# Patient Record
Sex: FEMALE | Race: AMERICAN INDIAN OR ALASKA NATIVE
[De-identification: names, ages, dates, MRNs, and addresses within clinical notes are randomized per-mention and may not be internally consistent; named-entity substitution may affect disease eponyms.]

---

## 2020-07-22 ENCOUNTER — HOSPITAL ENCOUNTER (EMERGENCY)
Dept: HOSPITAL 43 - DL.ED | Age: 35
LOS: 1 days | Discharge: NURSING, REHAB OR LONG-TERM CARE | End: 2020-07-23
Payer: SELF-PAY

## 2020-07-22 VITALS — SYSTOLIC BLOOD PRESSURE: 149 MMHG | HEART RATE: 119 BPM | DIASTOLIC BLOOD PRESSURE: 115 MMHG

## 2020-07-22 DIAGNOSIS — F17.210: ICD-10-CM

## 2020-07-22 DIAGNOSIS — N61.1: ICD-10-CM

## 2020-07-22 DIAGNOSIS — A41.9: Primary | ICD-10-CM

## 2020-07-22 DIAGNOSIS — Z20.828: ICD-10-CM

## 2020-07-22 PROCEDURE — U0002 COVID-19 LAB TEST NON-CDC: HCPCS

## 2020-07-22 NOTE — EDM.PDOC
ED HPI GENERAL MEDICAL PROBLEM





- General


Chief Complaint: Skin Complaint


Stated Complaint: SEIZURE


Time Seen by Provider: 20 23:15


Source of Information: Reports: Patient, RN


History Limitations: Reports: No Limitations





- History of Present Illness


INITIAL COMMENTS - FREE TEXT/NARRATIVE: 





ED with report of hit in right breast 2 weeks ago with softball, Increased pain 

tonight and "kids made her come". Has had chills unknown if fever. Large amount 

of drainage tonight when removing bra. Admits due to pain has not had bra off 

for 2-3 days. 


  ** Right Breast


Pain Score (Numeric/FACES): 10





- Related Data


                                    Allergies











Allergy/AdvReac Type Severity Reaction Status Date / Time


 


No Known Allergies Allergy   Verified 20 23:20











Home Meds: 


                                    Home Meds





Pnv with Ca,No.72/Iron/Fa [Prenatal Plus Tablet] 1 each PO DAILY 09/10/14 

[History]


Acetaminophen/oxyCODONE [Percocet 325-5 MG] 2 tab PO Q4H PRN #30 tablet 09/15/14

[Rx]


Docusate Sodium [Colace] 100 mg PO Q8H PRN #90 cap 09/15/14 [Rx]


Ferrous Sulfate 325 mg PO BID #60 tablet 09/15/14 [Rx]


Ibuprofen 800 mg PO Q8H PRN #30 tablet 09/15/14 [Rx]











Past Medical History


OB/GYN History: Reports: Pregnancy


Oncologic (Cancer) History: Reports: Other (See Below)


Other Oncologic History: Neuroblastoma, in remission





- Past Surgical History


Female  Surgical History: Reports:  Section





Social & Family History





- Tobacco Use


Smoking Status *Q: Current Every Day Smoker


Years of Tobacco use: 6


Packs/Tins Daily: 0.5





- Caffeine Use


Caffeine Use: Reports: None





- Recreational Drug Use


Recreational Drug Use: No





ED ROS GENERAL





- Review of Systems


Review Of Systems: Comprehensive ROS is negative, except as noted in HPI.





ED EXAM, SKIN/RASH


Exam: See Below


Exam Limited By: No Limitations


General Appearance: Alert, Obese


Ears: Normal External Exam, Hearing Grossly Normal, Normal TMs


Nose: Normal Inspection


Throat/Mouth: Normal Inspection


Head: Atraumatic, Normocephalic


Neck: Full Range of Motion


Respiratory/Chest: No Respiratory Distress, Crackles (right base greater than 

left, clear with cough), Other (right breast red warmth  large 3x2 blistered 

area, sloughing, brown thick purulent drainage, 3x 5 dark red / blackish area 

above nippele)


Cardiovascular: Normal Peripheral Pulses, Regular Rate, Rhythm, Tachycardia


GI/Abdominal: Normal Bowel Sounds


 (Female) Exam: Normal External Exam


Back Exam: Normal Inspection


Neurological: Alert, Oriented


Psychiatric: Anxious


Skin: Warm, Dry, Intact, Normal Color


Location, Skin: Other (right breast)


Characteristics: Bullous, Necrotic


Associated features: Warmth, Tenderness, Swelling, Induration, Weeping (brown 

thick drainage.)





Course





- Vital Signs


Last Recorded V/S: 


                                Last Vital Signs











Temp  98.8 F   20 23:13


 


Pulse  119 H  20 23:13


 


Resp  22 H  20 23:13


 


BP  149/115 H  20 23:13


 


Pulse Ox  100   20 23:13














- Orders/Labs/Meds


Orders: 


                               Active Orders 24 hr











 Category Date Time Status


 


 CULTURE WOUND [RM] Stat Lab  20 23:10 Received


 


 Blood Culture x2 Reflex Set [OM.PC] Stat Oth  20 23:14 Ordered











Labs: 


                                Laboratory Tests











  20 Range/Units





  00:15 00:20 00:23 


 


WBC   11.8 H   (5.0-10.0)  10^3/uL


 


RBC   4.49   (4.2-5.4)  10^6/uL


 


Hgb   12.9  D   (12.0-16.0)  g/dL


 


Hct   39.5   (37.0-47.0)  %


 


MCV   88.0   ()  fL


 


MCH   28.7   (27.0-34.0)  pg


 


MCHC   32.7 L   (33.0-35.0)  g/dL


 


Plt Count   312   (150-450)  10^3/uL


 


Neut % (Auto)   75.3 H   (42.2-75.2)  %


 


Lymph % (Auto)   14.4 L   (20.5-50.1)  %


 


Mono % (Auto)   8.4 H   (2-8)  %


 


Eos % (Auto)   1.7   (1.0-3.0)  %


 


Baso % (Auto)   0.2   (0.0-1.0)  %


 


Creatinine  0.39 L    (0.55-1.02)  mg/dL


 


Est Cr Clr Drug Dosing  192.06    mL/min


 


Estimated GFR (MDRD)  > 60    


 


Urine Color    Yellow  (YELLOW)  


 


Urine Appearance    Slightly cloudy  (CLEAR)  


 


Urine pH    8.0  (5.0-9.0)  


 


Ur Specific Gravity    1.020  (1.005-1.030)  


 


Urine Protein    Negative  (NEGATIVE)  


 


Urine Glucose (UA)    Negative  (NEGATIVE)  


 


Urine Ketones    Negative  (NEGATIVE)  


 


Urine Occult Blood    Moderate H  (NEGATIVE)  


 


Urine Nitrite    Negative  (NEGATIVE)  


 


Urine Bilirubin    Negative  (NEGATIVE)  


 


Urine Urobilinogen    1.0  (0.2-1.0)  mg/dL


 


Ur Leukocyte Esterase    Negative  (NEGATIVE)  


 


Urine RBC    5-10 H  /HPF


 


Urine WBC    0-5  (0-5/HPF)  /HPF


 


Ur Epithelial Cells    Moderate H  (NOT SEEN)  /HPF


 


Amorphous Sediment    Few  (NOT SEEN)  /HPF


 


Urine Bacteria    Few  (0-FEW/HPF)  /HPF


 


Urine Mucus    Rare  (NOT SEEN)  /LPF


 


Urine HCG, Qual     


 


Urine Opiates Screen     (NEGATIVE)  


 


Ur Oxycodone Screen     (NEGATIVE)  


 


Urine Methadone Screen     (NEGATIVE)  


 


Ur Barbiturates Screen     (NEGATIVE)  


 


U Tricyclic Antidepress     (NEGATIVE)  


 


Ur Phencyclidine Scrn     (NEGATIVE)  


 


Ur Amphetamine Screen     (NEGATIVE)  


 


U Methamphetamines Scrn     (NEGATIVE)  


 


Urine MDMA Screen     (NEGATIVE)  


 


U Benzodiazepines Scrn     (NEGATIVE)  


 


Urine Cocaine Screen     (NEGATIVE)  


 


U Marijuana (THC) Screen     (NEGATIVE)  


 


COVID-19 ([facility])     (NEGATIVE)  














  20 Range/Units





  00:23 00:23 01:18 


 


WBC     (5.0-10.0)  10^3/uL


 


RBC     (4.2-5.4)  10^6/uL


 


Hgb     (12.0-16.0)  g/dL


 


Hct     (37.0-47.0)  %


 


MCV     ()  fL


 


MCH     (27.0-34.0)  pg


 


MCHC     (33.0-35.0)  g/dL


 


Plt Count     (150-450)  10^3/uL


 


Neut % (Auto)     (42.2-75.2)  %


 


Lymph % (Auto)     (20.5-50.1)  %


 


Mono % (Auto)     (2-8)  %


 


Eos % (Auto)     (1.0-3.0)  %


 


Baso % (Auto)     (0.0-1.0)  %


 


Creatinine     (0.55-1.02)  mg/dL


 


Est Cr Clr Drug Dosing     mL/min


 


Estimated GFR (MDRD)     


 


Urine Color     (YELLOW)  


 


Urine Appearance     (CLEAR)  


 


Urine pH     (5.0-9.0)  


 


Ur Specific Gravity     (1.005-1.030)  


 


Urine Protein     (NEGATIVE)  


 


Urine Glucose (UA)     (NEGATIVE)  


 


Urine Ketones     (NEGATIVE)  


 


Urine Occult Blood     (NEGATIVE)  


 


Urine Nitrite     (NEGATIVE)  


 


Urine Bilirubin     (NEGATIVE)  


 


Urine Urobilinogen     (0.2-1.0)  mg/dL


 


Ur Leukocyte Esterase     (NEGATIVE)  


 


Urine RBC     /HPF


 


Urine WBC     (0-5/HPF)  /HPF


 


Ur Epithelial Cells     (NOT SEEN)  /HPF


 


Amorphous Sediment     (NOT SEEN)  /HPF


 


Urine Bacteria     (0-FEW/HPF)  /HPF


 


Urine Mucus     (NOT SEEN)  /LPF


 


Urine HCG, Qual   Negative   


 


Urine Opiates Screen  Negative    (NEGATIVE)  


 


Ur Oxycodone Screen  Negative    (NEGATIVE)  


 


Urine Methadone Screen  Negative    (NEGATIVE)  


 


Ur Barbiturates Screen  Negative    (NEGATIVE)  


 


U Tricyclic Antidepress  Negative    (NEGATIVE)  


 


Ur Phencyclidine Scrn  Negative    (NEGATIVE)  


 


Ur Amphetamine Screen  Negative    (NEGATIVE)  


 


U Methamphetamines Scrn  Positive H    (NEGATIVE)  


 


Urine MDMA Screen  Negative    (NEGATIVE)  


 


U Benzodiazepines Scrn  Negative    (NEGATIVE)  


 


Urine Cocaine Screen  Negative    (NEGATIVE)  


 


U Marijuana (THC) Screen  Negative    (NEGATIVE)  


 


COVID-19 ([facility])    Negative  (NEGATIVE)  











Meds: 


Medications














Discontinued Medications














Generic Name Dose Route Start Last Admin





  Trade Name Freq  PRN Reason Stop Dose Admin


 


Hydromorphone HCl  1 mg  20 23:14  20 23:49





  Dilaudid  IVPUSH  20 23:15  1 mg





  ONETIME ONE   Administration


 


Hydromorphone HCl  1 mg  20 01:53  20 01:59





  Dilaudid  IVPUSH  20 01:54  1 mg





  ONETIME ONE   Administration


 


Piperacillin Sod/Tazobactam  100 mls @ 200 mls/hr  20 23:46  20 

00:35





  Sod 3.375 gm/ Sodium Chloride  IV  20 00:15  200 mls/hr





  ONETIME ONE   Administration


 


Vancomycin HCl 1,500 mg/  500 mls @ 333.333 mls/hr  20 23:46  20 

01:15





  Sodium Chloride  IV  20 01:15  333.333 mls/hr





  ONETIME ONE   Administration


 


Sodium Chloride  1,000 mls @ 999 mls/hr  20 00:37  20 00:47





  Normal Saline  IV  20 01:37  999 mls/hr





  .BOLUS ONE   Administration


 


Iopamidol  100 ml  20 01:54  20 01:56





  Isovue-300 (61%)  IVPUSH  20 01:55  75 ml





  ONETIME ONE   Administration


 


Ondansetron HCl  4 mg  20 23:14  20 23:49





  Zofran  IVPUSH  20 23:15  4 mg





  ONETIME ONE   Administration














- Re-Assessments/Exams


Free Text/Narrative Re-Assessment/Exam: 





20 04:10


Dr Damon accepting patient. Tx via [facility].  Difficult IV and lab draw. Unable to 

obtain chemistry o rserum cultures prior to initiating antibiotic.





Departure





- Departure


Time of Disposition: 03:20


Disposition: DC/Tfer to Acute Hospital 02


Condition: Fair


Clinical Impression: 


 Abscess, Mastitis





Sepsis


Qualifiers:


 Sepsis type: sepsis due to unspecified organism Sepsis acute organ dysfunction 

status: unspecified Qualified Code(s): A41.9 - Sepsis, unspecified organism








- Discharge Information


*PRESCRIPTION DRUG MONITORING PROGRAM REVIEWED*: No


*COPY OF PRESCRIPTION DRUG MONITORING REPORT IN PATIENT SARAH: No


Referrals: 


PCP,Unobtain [Primary Care Provider] - 


Forms:  ED Department Discharge





Sepsis Event Note (ED)





- Evaluation


Sepsis Screening Result: Possible Sepsis Risk





- Focused Exam


Vital Signs: 


                                   Vital Signs











  Temp Pulse Resp BP Pulse Ox


 


 20 23:13  98.8 F  119 H  22 H  149/115 H  100














- My Orders


Last 24 Hours: 


My Active Orders





20 23:10


CULTURE WOUND [RM] Stat 





20 23:14


Blood Culture x2 Reflex Set [OM.PC] Stat 














- Assessment/Plan


Last 24 Hours: 


My Active Orders





20 23:10


CULTURE WOUND [RM] Stat 





20 23:14


Blood Culture x2 Reflex Set [OM.PC] Stat

## 2020-07-23 NOTE — CT
PROCEDURE INFORMATION: 

Exam: CT Chest With Contrast 

Exam date and time: 7/23/2020 1:28 AM 

Age: 35 years old 

Clinical indication: Other: RT breast pain swelling and wound draining; 

Additional info: Right breast wound draining 



TECHNIQUE: 

Imaging protocol: Computed tomography of the chest with intravenous contrast. 

Radiation optimization: All CT scans at this facility use at least one of these 

dose optimization techniques: automated exposure control; mA and/or kV 

adjustment per patient size (includes targeted exams where dose is matched to 

clinical indication); or iterative reconstruction. 

Contrast material: ISOVUE 300; Contrast volume: 75 ml; Contrast route: 

INTRAVENOUS (IV);  



COMPARISON: 

CR CHEST PA/LAT 3/6/2009 3:19 PM 



FINDINGS: 

Thyroid: There is a hypodense lesion within an enlarged right lobe of the 

thyroid gland. The lesion demonstrates a component of wall irregularity, and 

measures approximately 2.7 cm AP by 3.3 cm transverse dimensions. The lesion 

contains mild hyperattenuation foci, that may represent calcifications. There 

is a 1.1 x 1.2 cm hypodense lesion involving the left lobe of the thyroid 

gland. The differential diagnosis for the thyroid gland lesions includes cyst 

or nodule. 

Lungs: There are foci of mild atelectasis involving both lungs. Mild emphysema 

is present in the upper lobes. 

Pleural space: Unremarkable. No pneumothorax. No pleural effusion. 

Heart: Unremarkable. No cardiomegaly. No pericardial effusion. 

Aorta: Unremarkable. No aortic aneurysm. 

Lymph nodes: Enlarged right axillary lymph node, measuring 1.7 x 1.2 cm. This 

is a nonspecific finding. There is an enlarged right axillary lymph node that 

measures 1.8 x 1.2 cm. The hilum is diffusely fatty. This is suggestive of a 

benign process. 



Gallbladder and bile ducts: A calcified gallstone is present. 

Bones/joints: There are degenerative changes involving the spine. Evidence of a 

prior right thoracotomy. 

Soft tissues: There is severe, diffuse skin thickening involving the right 

breast. Moderate to severe diffuse stranding is present in the right breast. A 

right breast abscess is not identified. The right breast stranding extends 

medially, to the midline/left paracentral aspect of the anterior subcutaneous 

fat. There is a focus of hypodensity involving the anterior/lower aspect of the 

right hemithorax, just caudal to the right breast, measuring 1.8 cm AP x 2.1 cm 

transverse dimensions. There is an isodense right breast mass, that measures up 

to 1.2 x 1.0 cm. It is present in the inner aspect of the right breast.

 

IMPRESSION: 

1. Diffuse skin thickening involving the right breast. There is moderate to 

severe diffuse stranding within the right breast, consistent with inflammation 

and/or edema. The findings in the right breast could be due to mastitis. The 

differential diagnosis includes inflammatory carcinoma with diffuse, local 

spread. There is a 1.2 x 1.0 cm right breast mass. This finding may represent 

intramammary lymph node, fibroadenoma, or well-circumscribed carcinoma.

2. 2.1 x 1.8 cm focus of hypodensity involving the subcutaneous fat of the 

anterior/caudal aspect of the right hemithorax. This finding is just below the 

right breast. It may represent a site of inflammation. 

3. Mild emphysema. 



COMMENTS: 

Consistent with the American College of Radiology's Incidental Findings 

Committee white paper (J Am Elissa Radiol 2015): In patients aged 35 years and 

older with an incidental thyroid nodule equal to or greater than 1.5 cm 

detected on CT, MRI or extrathyroidal US, further evaluation with dedicated 

thyroid US is recommended for patients with normal life expectancy and without 

comorbidities. For smaller nodules without suspicious features, no further 

evaluation or follow up is recommended.

## 2021-05-12 ENCOUNTER — HOSPITAL ENCOUNTER (EMERGENCY)
Dept: HOSPITAL 43 - DL.ED | Age: 36
Discharge: HOME | End: 2021-05-12
Payer: MEDICAID

## 2021-05-12 VITALS — HEART RATE: 110 BPM | DIASTOLIC BLOOD PRESSURE: 97 MMHG | SYSTOLIC BLOOD PRESSURE: 169 MMHG

## 2021-05-12 DIAGNOSIS — L03.012: Primary | ICD-10-CM

## 2021-05-12 DIAGNOSIS — Z72.0: ICD-10-CM

## 2021-05-12 LAB
ANION GAP SERPL CALC-SCNC: 11.8 MEQ/L (ref 7–13)
CHLORIDE SERPL-SCNC: 104 MMOL/L (ref 98–107)
SODIUM SERPL-SCNC: 138 MMOL/L (ref 136–145)

## 2021-05-12 NOTE — EDM.PDOC
ED HPI GENERAL MEDICAL PROBLEM





- General


Chief Complaint: Skin Complaint


Stated Complaint: INFECTION IN HAND SPREADING TO ARM


Time Seen by Provider: 21 14:37


Source of Information: Reports: Patient, Old Records, RN, RN Notes Reviewed


History Limitations: Reports: No Limitations





- History of Present Illness


INITIAL COMMENTS - FREE TEXT/NARRATIVE: 


Pt presents to ED via POV with c/o left hand infection. Pt states she had a hang

nail a few days go on the 3rd and 4th fingers. Pt states she has been soaking 

her hand in H202. Denies fever/chills. Admits to some purulent drainage from the

cuticles of both fingers and redness spreading up the dorsum of the left hand.





Onset: Gradual


Duration: Constant


Location: Reports: Upper Extremity, Left


Quality: Reports: Ache


Severity: Moderate


Improves with: Reports: None


Worsens with: Reports: Movement


Associated Symptoms: Reports: No Other Symptoms


Treatments PTA: Reports: NSAIDS





- Related Data


                                    Allergies











Allergy/AdvReac Type Severity Reaction Status Date / Time


 


No Known Allergies Allergy   Verified 21 14:26











Home Meds: 


                                    Home Meds





Ibuprofen 800 mg PO Q8H PRN #30 tablet 09/15/14 [Rx]











Past Medical History


OB/GYN History: Reports: Pregnancy


Oncologic (Cancer) History: Reports: Other (See Below)


Other Oncologic History: Neuroblastoma, in remission





- Past Surgical History


Female  Surgical History: Reports:  Section





Social & Family History





- Tobacco Use


Tobacco Use Status *Q: Current Every Day Tobacco User


Years of Tobacco use: 10


Packs/Tins Daily: 1





- Caffeine Use


Caffeine Use: Reports: Coffee





- Recreational Drug Use


Recreational Drug Use: No





- Living Situation & Occupation


Living situation: Reports: with Family





ED ROS GENERAL





- Review of Systems


Review Of Systems: Comprehensive ROS is negative, except as noted in HPI.





ED EXAM, SKIN/RASH


Exam: See Below


Exam Limited By: No Limitations


General Appearance: Alert, WD/WN, No Apparent Distress, Obese


Nose: Normal Inspection


Throat/Mouth: Normal Inspection


Head: Atraumatic, Normocephalic


Neck: Normal Inspection


Respiratory/Chest: No Respiratory Distress, Lungs Clear, Normal Breath Sounds, 

No Accessory Muscle Use, Chest Non-Tender


Cardiovascular: Regular Rate, Rhythm, Tachycardia


Peripheral Pulses: 3+: Radial (L), Radial (R)


Extremities: Normal Range of Motion, Normal Capillary Refill, Redness (Left  3rd

 and 4th fingers with spread to dorsal left hand).  No: Joint Swelling


Neurological: Alert, Oriented, No Motor/Sensory Deficits


Psychiatric: Normal Mood


Skin: Warm, Dry, Erythema, Increased Warmth


Location, Skin: Upper Extremity, Left





Course





- Vital Signs


Last Recorded V/S: 


                                Last Vital Signs











Temp  98 F   21 14:27


 


Pulse  110 H  21 14:27


 


Resp  16   21 14:27


 


BP  169/97 H  21 14:27


 


Pulse Ox  100   21 14:27














- Orders/Labs/Meds


Orders: 


                               Active Orders 24 hr











 Category Date Time Status


 


 Peripheral IV Care [RC] .AS DIRECTED Care  21 14:38 Active


 


 CULTURE BLOOD [BC] Stat Lab  21 15:05 Results


 


 CULTURE BLOOD [BC] Stat Lab  21 16:22 Results


 


 CULTURE WOUND [RM] Stat Lab  21 14:25 Received


 


 Sodium Chloride 0.9% [Saline Flush] Med  21 14:38 Active





 10 ml FLUSH ASDIRECTED PRN   


 


 Blood Culture x2 Reflex Set [OM.PC] Stat Oth  21 14:38 Ordered


 


 Peripheral IV Insertion Adult [OM.PC] Stat Oth  21 14:38 Ordered








                                Medication Orders





Sodium Chloride (Sodium Chloride 0.9% 10 Ml Syringe)  10 ml FLUSH ASDIRECTED PRN


   PRN Reason: Keep Vein Open


   Last Admin: 21 15:27  Dose: 10 ml


   Documented by: ALEJA








Labs: 


                                Laboratory Tests











  21 Range/Units





  15:05 15:05 15:05 


 


WBC  6.3    (5.0-10.0)  10^3/uL


 


RBC  4.42    (4.2-5.4)  10^6/uL


 


Hgb  12.7    (12.0-16.0)  g/dL


 


Hct  38.4    (37.0-47.0)  %


 


MCV  86.9    ()  fL


 


MCH  28.7    (27.0-34.0)  pg


 


MCHC  33.1    (33.0-35.0)  g/dL


 


Plt Count  356    (150-450)  10^3/uL


 


Neut % (Auto)  70.9    (42.2-75.2)  %


 


Lymph % (Auto)  17.4 L    (20.5-50.1)  %


 


Mono % (Auto)  8.9 H    (2-8)  %


 


Eos % (Auto)  2.5    (1.0-3.0)  %


 


Baso % (Auto)  0.3    (0.0-1.0)  %


 


Sodium   138   (136-145)  mmol/L


 


Potassium   3.8   (3.5-5.1)  mmol/L


 


Chloride   104   ()  mmol/L


 


Carbon Dioxide   26   (21-32)  mmol/L


 


Anion Gap   11.8   (7-13)  mEq/L


 


BUN   7   (7-18)  mg/dL


 


Creatinine   0.70   (0.55-1.02)  mg/dL


 


Est Cr Clr Drug Dosing   107.00   mL/min


 


Estimated GFR (MDRD)   > 60   


 


BUN/Creatinine Ratio   10.0   (No establ ref range)  


 


Glucose   93   (70-99)  mg/dL


 


Lactic Acid    0.4  (0.4-2.0)  mmol/L


 


Calcium   8.0 L   (8.5-10.1)  mg/dL


 


Total Bilirubin   0.5   (0.2-1.0)  mg/dL


 


AST   17   (15-37)  U/L


 


ALT   26   (14-59)  U/L


 


Alkaline Phosphatase   61   ()  U/L


 


C-Reactive Protein   1.2 H   (0.0-0.9)  mg/dL


 


Total Protein   7.0   (6.4-8.2)  g/dL


 


Albumin   3.1 L   (3.4-5.0)  g/dL


 


Globulin   3.9   


 


Albumin/Globulin Ratio   0.79   











Meds: 


Medications











Generic Name Dose Route Start Last Admin





  Trade Name Freq  PRN Reason Stop Dose Admin


 


Sodium Chloride  10 ml  21 14:38  21 15:27





  Sodium Chloride 0.9% 10 Ml Syringe  FLUSH   10 ml





  ASDIRECTED PRN   Administration





  Keep Vein Open  














Discontinued Medications














Generic Name Dose Route Start Last Admin





  Trade Name Freq  PRN Reason Stop Dose Admin


 


Acetaminophen  650 mg  21 15:59  21 16:04





  Acetaminophen 325 Mg Tab  PO  21 16:00  650 mg





  NOW ONE   Administration


 


Bacitracin  1 dose  21 15:59  21 16:04





  Bacitracin Oint 1 Gm U/D Packet  TOP  21 16:00  1 dose





  ONETIME ONE   Administration


 


Diphenhydramine HCl  25 mg  21 14:40  21 15:26





  Diphenhydramine 50 Mg/Ml Sdv  IVPUSH  21 14:41  25 mg





  ONETIME ONE   Administration


 


Sodium Chloride  1,000 mls @ 999 mls/hr  21 14:38  21 15:25





  Normal Saline  IV  21 15:38  999 mls/hr





  .BOLUS ONE   Administration


 


Vancomycin HCl 1,500 mg/  500 mls @ 333.333 mls/hr  21 14:39  21 

15:25





  Sodium Chloride  IV  21 16:08  333.333 mls/hr





  ONETIME ONE   Administration


 


Ketorolac Tromethamine  30 mg  21 15:59  21 16:04





  Ketorolac 30 Mg/Ml Sdv  IVPUSH  21 16:00  30 mg





  ONETIME ONE   Administration


 


Lidocaine HCl  Confirm  21 14:55  21 15:27





  Lidocaine 2% Viscous Solution 15 Ml Cup  Administered  21 14:56  15 ml





  Dose   Administration





  15 ml  





  .ROUTE  





  .STK-MED ONE  


 


Lidocaine HCl  15 ml  21 15:54  21 15:55





  Lidocaine 2% Viscous Solution 15 Ml Cup  PO  21 15:55  Not Given





  ONETIME ONE  














Departure





- Departure


Time of Disposition: 17:14


Disposition: Home, Self-Care 01


Condition: Good


Clinical Impression: 


 Cellulitis of left hand








- Discharge Information


*PRESCRIPTION DRUG MONITORING PROGRAM REVIEWED*: Not Applicable


*COPY OF PRESCRIPTION DRUG MONITORING REPORT IN PATIENT SARAH: Not Applicable


Instructions:  Cellulitis, Adult


Forms:  ED Department Discharge


Additional Instructions: 


Rx: Clindamycin 300mg


Rx: Doxycycline 100mg


Rx: Bactroban (Mupirocin) Ointment 2%


Follow up in clinic in 2 days for recheck.


Return to ER if worse at any time.





Sepsis Event Note (ED)





- Evaluation


Sepsis Screening Result: No Definite Risk





- Focused Exam


Vital Signs: 


                                   Vital Signs











  Temp Pulse Resp BP Pulse Ox


 


 21 14:27  98 F  110 H  16  169/97 H  100














- My Orders


Last 24 Hours: 


My Active Orders





21 14:25


CULTURE WOUND [RM] Stat 





21 14:38


Peripheral IV Care [RC] .AS DIRECTED 


Sodium Chloride 0.9% [Saline Flush]   10 ml FLUSH ASDIRECTED PRN 


Blood Culture x2 Reflex Set [OM.PC] Stat 


Peripheral IV Insertion Adult [OM.PC] Stat 





21 15:05


CULTURE BLOOD [BC] Stat 





21 16:22


CULTURE BLOOD [BC] Stat 














- Assessment/Plan


Last 24 Hours: 


My Active Orders





21 14:25


CULTURE WOUND [RM] Stat 





21 14:38


Peripheral IV Care [RC] .AS DIRECTED 


Sodium Chloride 0.9% [Saline Flush]   10 ml FLUSH ASDIRECTED PRN 


Blood Culture x2 Reflex Set [OM.PC] Stat 


Peripheral IV Insertion Adult [OM.PC] Stat 





21 15:05


CULTURE BLOOD [BC] Stat 





21 16:22


CULTURE BLOOD [BC] Stat

## 2023-03-26 ENCOUNTER — HOSPITAL ENCOUNTER (EMERGENCY)
Dept: HOSPITAL 43 - DL.ED | Age: 38
LOS: 1 days | Discharge: NURSING, REHAB OR LONG-TERM CARE | End: 2023-03-27
Payer: MEDICAID

## 2023-03-26 DIAGNOSIS — L97.922: Primary | ICD-10-CM

## 2023-03-26 LAB
ANION GAP SERPL CALC-SCNC: 10.6 MEQ/L (ref 7–13)
CHLORIDE SERPL-SCNC: 103 MMOL/L (ref 98–107)
EGFRCR SERPLBLD CKD-EPI 2021: 107 ML/MIN (ref 60–?)
SODIUM SERPL-SCNC: 140 MMOL/L (ref 136–145)

## 2023-03-26 PROCEDURE — 73590 X-RAY EXAM OF LOWER LEG: CPT

## 2023-03-26 PROCEDURE — 85025 COMPLETE CBC W/AUTO DIFF WBC: CPT

## 2023-03-26 PROCEDURE — 96365 THER/PROPH/DIAG IV INF INIT: CPT

## 2023-03-26 PROCEDURE — 96376 TX/PRO/DX INJ SAME DRUG ADON: CPT

## 2023-03-26 PROCEDURE — 36415 COLL VENOUS BLD VENIPUNCTURE: CPT

## 2023-03-26 PROCEDURE — 96375 TX/PRO/DX INJ NEW DRUG ADDON: CPT

## 2023-03-26 PROCEDURE — 80053 COMPREHEN METABOLIC PANEL: CPT

## 2023-03-26 PROCEDURE — 99284 EMERGENCY DEPT VISIT MOD MDM: CPT

## 2023-03-27 VITALS — HEART RATE: 96 BPM | DIASTOLIC BLOOD PRESSURE: 123 MMHG | SYSTOLIC BLOOD PRESSURE: 139 MMHG
